# Patient Record
Sex: FEMALE | Race: WHITE | Employment: UNEMPLOYED | URBAN - METROPOLITAN AREA
[De-identification: names, ages, dates, MRNs, and addresses within clinical notes are randomized per-mention and may not be internally consistent; named-entity substitution may affect disease eponyms.]

---
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
## 2025-06-10 NOTE — ED NOTES
Pt is sitting calmly on mat in room. Pt verbally agrees to stay in room and follow staff directions. Pt is MUCH calmer than when she arrived. Given sandwich and juice. Seclusion discontinued.   [Diarrhea: Grade 0] : Diarrhea: Grade 0 [Negative] : Heme/Lymph